# Patient Record
Sex: FEMALE | Race: WHITE | NOT HISPANIC OR LATINO | Employment: UNEMPLOYED | URBAN - METROPOLITAN AREA
[De-identification: names, ages, dates, MRNs, and addresses within clinical notes are randomized per-mention and may not be internally consistent; named-entity substitution may affect disease eponyms.]

---

## 2018-01-09 ENCOUNTER — TELEPHONE (OUTPATIENT)
Dept: NEUROSURGERY | Facility: CLINIC | Age: 43
End: 2018-01-09

## 2018-01-09 NOTE — TELEPHONE ENCOUNTER
Reason for Call:  Other call back    Detailed comments: pt calling and would like to know if Dr Her could see her for migraines her insurance company gave her Dr Her as an option to see for her chronic migraines. Please call pt to advise if it should be with neurosurgery or neurology? . Thank You Sandy    Phone Number Patient can be reached at: Cell number on file:    Telephone Information:   Mobile [phone]       Best Time: anytime today would be great    Can we leave a detailed message on this number? YES    Call taken on 1/9/2018 at 2:50 PM by Sandy Farrell

## 2018-01-09 NOTE — TELEPHONE ENCOUNTER
Dr. Her would recommend patient see Neurology for her chronic migraines. Sent message to scheduling.

## 2018-01-10 NOTE — TELEPHONE ENCOUNTER
Left detailed message on pt's personal cell phone stating that Dr Her said she should see Neurology I did leave her the # [phone] for the [facility] , to schedule for the [place] Location. Thank You Sandy

## 2024-07-26 ENCOUNTER — TRANSCRIBE ORDERS (OUTPATIENT)
Dept: OTHER | Age: 49
End: 2024-07-26

## 2024-07-26 DIAGNOSIS — N39.0 RECURRENT UTI: Primary | ICD-10-CM
